# Patient Record
(demographics unavailable — no encounter records)

---

## 2025-07-22 NOTE — ASSESSMENT
[FreeTextEntry1] : 36 year old female with PMH of Graves' Disease and sub-centimeter thyroid nodules who presents for endocrine follow up.    Her Graves' Disease appears to be in remission and she appears clinically euthyroid.  1.  Graves' Disease-  check TFTs and thyroid antibodies now.    2.  thyroid nodules-  Check thyroid US now.      Follow up in one year.

## 2025-07-22 NOTE — HISTORY OF PRESENT ILLNESS
[FreeTextEntry1] : Here for follow up of Graves' Disease and thyroid nodules  Diagnosed with hyperthyroidism due to Graves' Disease in 2014. Was treated with Methimazole for several years and weaned off therapy about 4 years ago.  Has been euthyroid since that time.    TSI normalized, but does have anti-TPO and Anti-Tg ab. Has sub-centimeter nodules.     Denies any associated palpitations, tremors or neck pain.

## 2025-07-22 NOTE — PHYSICAL EXAM
[Healthy Appearance] : healthy appearance [No Acute Distress] : no acute distress [Normal Sclera/Conjunctiva] : normal sclera/conjunctiva [No Proptosis] : no proptosis [No Neck Mass] : no neck mass was observed [No LAD] : no lymphadenopathy [Supple] : the neck was supple [No Thyroid Nodules] : no palpable thyroid nodules [No Respiratory Distress] : no respiratory distress [Clear to Auscultation] : lungs were clear to auscultation bilaterally [Normal S1, S2] : normal S1 and S2 [No Murmurs] : no murmurs [Normal Rate] : heart rate was normal [Regular Rhythm] : with a regular rhythm [Acanthosis Nigricans] : no acanthosis nigricans [No Tremors] : no tremors [Normal Affect] : the affect was normal [Normal Insight/Judgement] : insight and judgment were intact [Normal Mood] : the mood was normal

## 2025-07-22 NOTE — DATA REVIEWED
[FreeTextEntry1] : Thyroid US:  7/18/2023: Right upper pole 0.6 x 0.3 x 0.6 cm complex cystic nodule, stable Left mid pole 0.4 x 0.3 x 0.5 cm cystic nodule, stable

## 2025-07-25 NOTE — PROCEDURE
[Other: ___] : [unfilled]. All measurements will be reported as longitudinal x doe-posterior x transverse. [Report dated ___] : Report dated [unfilled] [Multinodular Goiter] : multinodular goiter [] : a heterogeneous parenchyma [FreeTextEntry1] : 4.3 x 1.5 x 1.6 [FreeTextEntry5] : 4.3 x 1.7 x 1.7 [FreeTextEntry2] : 0.3 [de-identified] : There are multiple nodules visualized including: Right upper pole 0.7 x 0.2 x 0.5 cm cystic nodule (stable) Left mid pole 0.4 x 0.2 x 0.4 cm cystic nodule (stable) Left upper pole 0.3 x 0.1 x 0.3 cm cystic nodule (not previously described)

## 2025-07-25 NOTE — IMPRESSION
[FreeTextEntry1] : Heterogeneous gland, consistent with known underlying autoimmune thyroid disease with three subcentimeter cystic nodules.